# Patient Record
Sex: MALE | Race: WHITE
[De-identification: names, ages, dates, MRNs, and addresses within clinical notes are randomized per-mention and may not be internally consistent; named-entity substitution may affect disease eponyms.]

---

## 2020-03-18 ENCOUNTER — HOSPITAL ENCOUNTER (EMERGENCY)
Dept: HOSPITAL 50 - VM.ED | Age: 70
Discharge: HOME | End: 2020-03-18
Payer: MEDICARE

## 2020-03-18 VITALS — HEART RATE: 112 BPM | SYSTOLIC BLOOD PRESSURE: 173 MMHG | DIASTOLIC BLOOD PRESSURE: 88 MMHG

## 2020-03-18 DIAGNOSIS — Z88.5: ICD-10-CM

## 2020-03-18 DIAGNOSIS — E11.9: ICD-10-CM

## 2020-03-18 DIAGNOSIS — Z91.018: ICD-10-CM

## 2020-03-18 DIAGNOSIS — Z91.012: ICD-10-CM

## 2020-03-18 DIAGNOSIS — J32.9: Primary | ICD-10-CM

## 2020-03-18 DIAGNOSIS — Z88.0: ICD-10-CM

## 2020-03-18 DIAGNOSIS — Z79.899: ICD-10-CM

## 2020-03-18 DIAGNOSIS — J45.909: ICD-10-CM

## 2020-03-18 DIAGNOSIS — J40: ICD-10-CM

## 2020-03-18 DIAGNOSIS — Z79.4: ICD-10-CM

## 2020-03-18 LAB
ANION GAP SERPL CALC-SCNC: 16 MMOL/L (ref 10–20)
CHLORIDE SERPL-SCNC: 100 MMOL/L (ref 98–107)
SODIUM SERPL-SCNC: 136 MMOL/L (ref 136–145)

## 2020-03-18 NOTE — CR
______________________________________________________________________________   

  

[id number] RAD/RAD Chest PA And Lateral  

EXAM:   

   

 RAD Chest PA And Lateral  

   

 CLINICAL DATA:   

   

 SHORTNESS OF BREATH  

   

 CHEST PAIN  

   

 COMPARISON:   

   

 NO PREVIOUS SIMILAR EXAM IS AVAILABLE.  

   

 FINDINGS:   

   

 There is minimal pleural reaction at the left lung base  

   

 There are surgical changes of the cervical spine and lumbar spine  

   

 The lungs otherwise are clear  

   

 The cardiomediastinal contour is moderately prominent  

   

 IMPRESSION:  

   

 NO ACUTE PROCESS.  

   

 Electronically signed by Yoel Concepcion MD on 3/18/2020 7:39 AM  

   

  

Yoel Concepcion MD                 

 03/18/20 0741    

  

Thank you for allowing us to participate in the care of your patient.

## 2020-06-14 ENCOUNTER — HOSPITAL ENCOUNTER (EMERGENCY)
Dept: HOSPITAL 50 - VM.ED | Age: 70
Discharge: HOME | End: 2020-06-14
Payer: MEDICARE

## 2020-06-14 VITALS — HEART RATE: 77 BPM | DIASTOLIC BLOOD PRESSURE: 81 MMHG | SYSTOLIC BLOOD PRESSURE: 132 MMHG

## 2020-06-14 DIAGNOSIS — Z88.5: ICD-10-CM

## 2020-06-14 DIAGNOSIS — Z88.1: ICD-10-CM

## 2020-06-14 DIAGNOSIS — Z88.8: ICD-10-CM

## 2020-06-14 DIAGNOSIS — Z79.899: ICD-10-CM

## 2020-06-14 DIAGNOSIS — Z91.010: ICD-10-CM

## 2020-06-14 DIAGNOSIS — J45.909: ICD-10-CM

## 2020-06-14 DIAGNOSIS — Z79.4: ICD-10-CM

## 2020-06-14 DIAGNOSIS — Z91.012: ICD-10-CM

## 2020-06-14 DIAGNOSIS — I26.99: Primary | ICD-10-CM

## 2020-06-14 DIAGNOSIS — Z88.0: ICD-10-CM

## 2020-06-14 DIAGNOSIS — E11.9: ICD-10-CM

## 2020-06-14 LAB
ANION GAP SERPL CALC-SCNC: 16 MMOL/L (ref 10–20)
CHLORIDE SERPL-SCNC: 101 MMOL/L (ref 98–107)
SODIUM SERPL-SCNC: 134 MMOL/L (ref 136–145)

## 2020-06-14 PROCEDURE — U0002 COVID-19 LAB TEST NON-CDC: HCPCS

## 2020-06-14 NOTE — EDM.PDOC
ED HPI GENERAL MEDICAL PROBLEM





- General


Chief Complaint: Respiratory Problem


Stated Complaint: shortness of breath


Time Seen by Provider: 06/14/20 13:52


Source of Information: Reports: Patient


History Limitations: Reports: No Limitations





- History of Present Illness


INITIAL COMMENTS - FREE TEXT/NARRATIVE: 





Patient is a 69 year old who presents to ER with complaints of shortness of 

breath, trouble getting his air in.  Does use a CPAP and was unable to use it 

as couldn't get enough air in.  Tried a nebulizer treatment without relief.  

Had spinal fusion done 11 days ago, has been wearing his back brace as 

directed.  Does not feel that is inhibiting him but does worry about the pain 

meds suppressing his breathing as they have done that in the past.  Has a mild 

cough.  No production.  No fevers.  Has not noted any swelling in his legs as 

of late.  Unable to lay down well without feeling short of breath.  Was 

drinking water today and felt like he was choking somewhat as it wouldn't pass 

through.





Patient noted to have fusion of T2-T11.  Relates it is his 6th back surgery.


Onset: Gradual


Duration: Hour(s):, Constant


Location: Reports: Chest


Improves with: Reports: Other (sitting forward)


Associated Symptoms: Reports: Cough, Shortness of Breath.  Denies: Confusion, 

Chest Pain, cough w sputum, Fever/Chills, Loss of Appetite, Nausea/Vomiting


Treatments PTA: Reports: Breathing Treatments





- Related Data


 Allergies











Allergy/AdvReac Type Severity Reaction Status Date / Time


 


albumin colloid, human Allergy  Other Verified 06/14/20 14:04


 


atorvastatin [From Lipitor] Allergy  Muscle Verified 06/14/20 14:04





   Aches  


 


benztropine Allergy  Other Verified 06/14/20 14:03


 


levofloxacin [From Levaquin] Allergy  Nausea and Verified 06/14/20 14:04





   Vomiting  


 


morphine Allergy  Other Verified 06/14/20 14:03


 


Penicillins Allergy  Other Verified 06/14/20 14:03


 


egg white Allergy  Numbness Uncoded 03/18/20 06:46


 


peanuts Allergy  Chest Pain Uncoded 03/18/20 06:46











Home Meds: 


 Home Meds





Insulin Glarg,Human.Rec.Analog [Lantus] 80 units SUBCUT BEDTIME 10/17/14 [

History]


atorvaSTATin Calcium [Atorvastatin Calcium] 1 tab PO DAILY 10/20/14 [History]


Albuterol [Proair HFA] 2 puff INH Q4HR PRN 06/14/20 [History]


Albuterol [Proventil Neb Soln] 1.25 mg NEB Q6H PRN 06/14/20 [History]


Budesonide [Pulmicort] 0.5 mg IH BID 06/14/20 [History]


Ondansetron [Zofran] 4 mg PO ASDIRECTED PRN 06/14/20 [History]


oxyCODONE 5 mg PO Q4H PRN 06/14/20 [History]











Past Medical History


Respiratory History: Reports: Asthma, Sleep Apnea


Gastrointestinal History: Reports: Colon Polyp


Other Genitourinary History: kidney stone hx


Musculoskeletal History: Reports: Back Pain, Chronic


Endocrine/Metabolic History: Reports: Diabetes, Type II


Other Endocrine/Metabolic History: takes insulin


Other Dermatologic History: left eye and lip-skin CA removed





- Infectious Disease History


Infectious Disease History: Reports: Chicken Pox





- Past Surgical History


Other Male  Surgeries/Procedures: partial left kidney removal, 30 years ago


Musculoskeletal Surgical History: Reports: Shoulder Surgery, Other (See Below)


Other Musculoskeletal Surgeries/Procedures:: fusion in 1968, 1969, rods and 

screws 1999, removed and replaced in 2001, 2002 more rodes and screws (Dr. Ying

)- lumbar





Social & Family History





- Caffeine Use


Caffeine Use: Reports: Coffee





ED ROS GENERAL





- Review of Systems


Review Of Systems: See Below


Constitutional: Reports: Malaise, Weakness.  Denies: Fever, Chills, Decreased 

Appetite


HEENT: Denies: Rhinitis, Sinus Problem, Throat Pain


Respiratory: Reports: Shortness of Breath, Cough.  Denies: Sputum


Cardiovascular: Denies: Chest Pain, Edema, Lightheadedness


Endocrine: Reports: Fatigue


GI/Abdominal: Denies: Abdominal Pain, Constipation, Diarrhea, Nausea, Vomiting


: Reports: No Symptoms


Musculoskeletal: Reports: Back Pain


Skin: Reports: Other (incision)


Neurological: Reports: Weakness





ED EXAM, GENERAL





- Physical Exam


Exam: See Below


Exam Limited By: No Limitations


General Appearance: Alert, WD/WN, No Apparent Distress


Ears: Normal External Exam, Normal TMs


Nose: Normal Inspection, Normal Mucosa, No Blood


Throat/Mouth: Normal Inspection, Normal Oropharynx


Head: Normocephalic


Neck: Normal Inspection, Supple, Non-Tender


Respiratory/Chest: Lungs Clear, Decreased Breath Sounds


Cardiovascular: Regular Rate, Rhythm, No Edema


GI/Abdominal: Normal Bowel Sounds, Soft, Non-Tender


Back Exam: Other (Full length of his back incision is clean and dry, no redness 

or drainage.  )


Extremities: Normal Inspection, No Pedal Edema


Neurological: Alert, Oriented


Skin Exam: Warm, Dry, Wound/Incision (healing well)





Course





- Vital Signs


Last Recorded V/S: 


 Last Vital Signs











Temp  98.4 F   06/14/20 16:45


 


Pulse  77   06/14/20 16:45


 


Resp  20   06/14/20 16:45


 


BP  132/81   06/14/20 16:45


 


Pulse Ox  99   06/14/20 16:45














- Orders/Labs/Meds


Orders: 


 Active Orders 24 hr











 Category Date Time Status


 


 Apixaban [Eliquis] Med  06/14/20 20:00 Active





 2.5 mg PO BID   








 Medication Orders





Apixaban (Eliquis)  2.5 mg PO BID KATI








Labs: 


 Laboratory Tests











  06/14/20 06/14/20 06/14/20 Range/Units





  13:50 15:08 15:08 


 


WBC   12.1 H   (4.0-10.0)  x10^3/uL


 


RBC   3.75 L   (4.5-6.0)  x10^6/uL


 


Hgb   11.8 L D   (14.0-18.0)  g/dL


 


Hct   35.4 L   (40.0-52.0)  %


 


MCV   94.4 H   (78.0-93.0)  fL


 


MCH   31.5   (26.0-32.0)  pg


 


MCHC   33.3   (32.0-36.0)  g/dL


 


RDW Coeff of Guido   12.3   (10.0-15.0)  %


 


Plt Count   358  D   (130-400)  x10^3/uL


 


Neut % (Auto)   68.5   (50.0-80.0)  %


 


Lymph % (Auto)   18.1 L   (25.0-50.0)  %


 


Mono % (Auto)   6.6   (2.0-11.0)  %


 


Eos % (Auto)   6.3 H   (0.0-4.0)  %


 


Baso % (Auto)   0.5   (0.2-1.2)  %


 


D-Dimer, Quantitative    2.56 H  (<=0.58)  mg/LFEU


 


Sodium     (136-145)  mmol/L


 


Potassium     (3.5-5.1)  mmol/L


 


Chloride     ()  mmol/L


 


Carbon Dioxide     (21-32)  mmol/L


 


Anion Gap     (10-20)  mmol/L


 


BUN     (7-18)  mg/dL


 


Creatinine     (0.70-1.30)  mg/dL


 


Est Cr Clr Drug Dosing     


 


Estimated GFR (MDRD)     


 


Glucose     ()  mg/dL


 


Calcium     (8.5-10.1)  mg/dL


 


Lactate Dehydrogenase     ()  U/L


 


Creatine Kinase     ()  U/L


 


Troponin I     (<=0.056)  ng/mL


 


C-Reactive Protein     (<=0.9)  mg/dL


 


NT-Pro-B Natriuret Pep     (<=125)  pg/mL


 


SARS-CoV-2 RNA (RT-PCR)  Negative    (NEGATIVE)  














  06/14/20 06/14/20 Range/Units





  15:08 15:08 


 


WBC    (4.0-10.0)  x10^3/uL


 


RBC    (4.5-6.0)  x10^6/uL


 


Hgb    (14.0-18.0)  g/dL


 


Hct    (40.0-52.0)  %


 


MCV    (78.0-93.0)  fL


 


MCH    (26.0-32.0)  pg


 


MCHC    (32.0-36.0)  g/dL


 


RDW Coeff of Guido    (10.0-15.0)  %


 


Plt Count    (130-400)  x10^3/uL


 


Neut % (Auto)    (50.0-80.0)  %


 


Lymph % (Auto)    (25.0-50.0)  %


 


Mono % (Auto)    (2.0-11.0)  %


 


Eos % (Auto)    (0.0-4.0)  %


 


Baso % (Auto)    (0.2-1.2)  %


 


D-Dimer, Quantitative    (<=0.58)  mg/LFEU


 


Sodium   134 L  (136-145)  mmol/L


 


Potassium   5.0  (3.5-5.1)  mmol/L


 


Chloride   101  ()  mmol/L


 


Carbon Dioxide   22  (21-32)  mmol/L


 


Anion Gap   16.0  (10-20)  mmol/L


 


BUN   7  (7-18)  mg/dL


 


Creatinine   1.0  (0.70-1.30)  mg/dL


 


Est Cr Clr Drug Dosing   TNP  


 


Estimated GFR (MDRD)   > 60  


 


Glucose   142 H  ()  mg/dL


 


Calcium   9.0  (8.5-10.1)  mg/dL


 


Lactate Dehydrogenase  186   ()  U/L


 


Creatine Kinase  51   ()  U/L


 


Troponin I  < 0.017   (<=0.056)  ng/mL


 


C-Reactive Protein  3.5 H   (<=0.9)  mg/dL


 


NT-Pro-B Natriuret Pep  160 H   (<=125)  pg/mL


 


SARS-CoV-2 RNA (RT-PCR)    (NEGATIVE)  











Meds: 


Medications











Generic Name Dose Route Start Last Admin





  Trade Name Freq  PRN Reason Stop Dose Admin


 


Apixaban  2.5 mg  06/14/20 20:00  





  Eliquis  PO   





  BID KATI   





     





     





     





     














Discontinued Medications














Generic Name Dose Route Start Last Admin





  Trade Name Freq  PRN Reason Stop Dose Admin


 


Hydrocodone Bitart/Acetaminophen  1 tab  06/14/20 15:38  06/14/20 16:16





  Norco 325-5 Mg  PO  06/14/20 15:39  1 tab





  ONETIME ONE   Administration





     





     





     





     


 


Hydrocodone Bitart/Acetaminophen  1 packet  06/14/20 17:22  





  Take Home: Acetam/Hydrocodon 325-5 Mg, 5 Pack  PO  06/14/20 17:23  





  ONETIME ONE   





     





     





     





     


 


Iopamidol  100 ml  06/14/20 15:57  06/14/20 17:02





  Isovue-300 (61%)  IVPUSH  06/14/20 15:58  100 ml





  ONETIME ONE   Administration





     





     





     





     














- Re-Assessments/Exams


Free Text/Narrative Re-Assessment/Exam: 





06/14/20 1400-1530-Patient waiting on lab results as was with other emergent 

patient. 





15:40-labs are noted.  WBC mildly elevated.  Sodium 134.  D-dimer is 2.56.  CRP 

3.5.  ProBNP 160.  Will proceed with CTA of chest.  Discussed change in 

narcotics with patient.  Had similar type of chest discomfort/shortness of 

breath from morphine in the past so hesitant to give him Dilaudid.  Is willing 

to try Norco, has used in the past but states doesn't work as well as 

oxycodone.  Does not want to take any further oxycodone as he feels it is 

contributing to his shortness of breath and discomfort.  Is still 

uncomfortable.  [place] given.  Difficulty finding a vein for procedure, patient 

not tolerating much at this point without being frustrated.  Stressed the 

importance of having the CTA due to severity of PEs and potential fatality with 

them.  Discussed patient going to [facility] to have test done as could do VQ study.

  Refused.  Discussed possibly treating for PE tonight and returning for test 

tomorrow.  Eventually were able to get IV placed.  


06/14/20 17:26


Patient CT scan is positive for 2 small PEs.  Will start Eliquis tonight.  











Departure





- Departure


Time of Disposition: 17:27


Disposition: Home, Self-Care 01


Condition: Fair


Clinical Impression: 


 Pulmonary emboli








- Discharge Information


*PRESCRIPTION DRUG MONITORING PROGRAM REVIEWED*: No


*COPY OF PRESCRIPTION DRUG MONITORING REPORT IN PATIENT RODNEY: No


Instructions:  Pulmonary Embolism


Referrals: 


Saba Freeman DO [Primary Care Provider] - 


Forms:  ED Department Discharge


Additional Instructions: 


1.  Rest


2.  Norco every 6 hours as needed for pain.  


3.  Start Eliquis- 10 mg twice a day for 7 days then 5 mg BID


4.  Follow up with Dr. Freeman later this week





Sepsis Event Note (ED)





- Focused Exam


Vital Signs: 


 Vital Signs











  Temp Pulse Resp BP Pulse Ox


 


 06/14/20 16:45  98.4 F  77  20  132/81  99


 


 06/14/20 15:45   75  16  129/77  98


 


 06/14/20 14:45   78  16  138/84  99


 


 06/14/20 13:45  97.6 F  81  18  148/86 H  99














- My Orders


Last 24 Hours: 


My Active Orders





06/14/20 20:00


Apixaban [Eliquis]   2.5 mg PO BID 














- Assessment/Plan


Last 24 Hours: 


My Active Orders





06/14/20 20:00


Apixaban [Eliquis]   2.5 mg PO BID

## 2020-06-14 NOTE — CR
______________________________________________________________________________   

  

[id number] RAD/RAD Chest PA And Lateral  

EXAM: FRONTAL AND LATERAL CHEST  

   

 INDICATION: SHORTNESS OF BREATH.  

   

 COMPARISON: March 18, 2020.  

   

 DISCUSSION: Extensive posterior and left lateral fusion hardware in the  

 thoracolumbar spine. Some of the fusion hardware in the thoracic spine is new  

 relative to the prior study. The heart is at upper limits of normal for size  

 without evidence of congestive heart failure. No acute infiltrates.  

   

 IMPRESSION:    

 1.  No acute findings.  

   

 Electronically signed by Devon Oquendo MD on 6/14/2020 3:19 PM  

   

  

Devon Oquendo MD                 

 06/14/20 [id number]    

  

Thank you for allowing us to participate in the care of your patient.

## 2020-06-14 NOTE — CT
______________________________________________________________________________   

  

[id number] CT/CTA Chest  

EXAM: CT ANGIOGRAM CHEST  

   

 INDICATION: Shortness of breath and elevated d-dimer.  

   

 COMPARISON: Chest radiograph same date.  

   

 DISCUSSION:   

 There are few small opacified right upper and right middle lobe pulmonary  

 arteries compatible with acute emboli. No definite emboli on the left, but  

 motion artifact mildly limits assessment. No evidence of right heart strain.  

 There is a small associated right pleural effusion. Mild atelectasis in the lung  

 bases with no consolidation.  

   

 There are indeterminate noncalcified nodules in the right middle lobe measuring  

 8.5 x 5 and 5 mm (images 61 and 58 of series 5).  

   

 No adenopathy. No left effusion or pericardial effusion. Normal heart size.  

 Scattered atherosclerotic plaque in the aorta and coronary arteries.  

   

 Extensive posterior fusion changes within the thoracolumbar spine are partially  

 imaged.  

   

 Results called at time of dictation.  

   

 IMPRESSION:    

 1.  Small acute right upper and right middle lobe pulmonary emboli without  

 evidence of right heart strain.  

 2.  Small right pleural effusion.  

 3.  Indeterminate noncalcified 8.5 by size and 5 mm right middle lobe pulmonary  

 nodules.  

   

 Electronically signed by Devon Oquendo MD on 6/14/2020 5:20 PM  

   

  

Devon Oquendo MD                 

 06/14/20 [id number]    

  

Thank you for allowing us to participate in the care of your patient.

## 2022-04-18 ENCOUNTER — HOSPITAL ENCOUNTER (OUTPATIENT)
Dept: HOSPITAL 50 - VM.SDS | Age: 72
Discharge: HOME | End: 2022-04-18
Attending: SURGERY
Payer: MEDICARE

## 2022-04-18 VITALS — SYSTOLIC BLOOD PRESSURE: 135 MMHG | DIASTOLIC BLOOD PRESSURE: 69 MMHG | HEART RATE: 65 BPM

## 2022-04-18 DIAGNOSIS — Z91.010: ICD-10-CM

## 2022-04-18 DIAGNOSIS — E11.42: ICD-10-CM

## 2022-04-18 DIAGNOSIS — E11.69: ICD-10-CM

## 2022-04-18 DIAGNOSIS — Z88.0: ICD-10-CM

## 2022-04-18 DIAGNOSIS — Z12.11: Primary | ICD-10-CM

## 2022-04-18 DIAGNOSIS — E66.01: ICD-10-CM

## 2022-04-18 DIAGNOSIS — Z88.8: ICD-10-CM

## 2022-04-18 DIAGNOSIS — Z79.899: ICD-10-CM

## 2022-04-18 DIAGNOSIS — Z91.012: ICD-10-CM

## 2022-04-18 DIAGNOSIS — Z79.4: ICD-10-CM

## 2022-04-18 DIAGNOSIS — D12.6: ICD-10-CM

## 2022-04-18 DIAGNOSIS — Z87.891: ICD-10-CM

## 2024-08-10 ENCOUNTER — HOSPITAL ENCOUNTER (EMERGENCY)
Dept: HOSPITAL 50 - VM.ED | Age: 74
Discharge: HOME | End: 2024-08-10
Payer: MEDICARE

## 2024-08-10 VITALS — DIASTOLIC BLOOD PRESSURE: 74 MMHG | SYSTOLIC BLOOD PRESSURE: 110 MMHG | HEART RATE: 92 BPM

## 2024-08-10 DIAGNOSIS — Z91.018: ICD-10-CM

## 2024-08-10 DIAGNOSIS — Z79.899: ICD-10-CM

## 2024-08-10 DIAGNOSIS — U07.1: Primary | ICD-10-CM

## 2024-08-10 DIAGNOSIS — Z91.010: ICD-10-CM

## 2024-08-10 DIAGNOSIS — Z88.1: ICD-10-CM

## 2024-08-10 DIAGNOSIS — Z88.0: ICD-10-CM

## 2024-08-10 DIAGNOSIS — J45.909: ICD-10-CM

## 2024-08-10 DIAGNOSIS — Z79.4: ICD-10-CM

## 2024-08-10 DIAGNOSIS — Z91.048: ICD-10-CM

## 2024-08-10 DIAGNOSIS — E66.9: ICD-10-CM

## 2024-08-10 LAB
ALBUMIN SERPL-MCNC: 3.7 G/DL (ref 3.4–5)
ALBUMIN/GLOB SERPL: 0.88 {RATIO}
ALP SERPL-CCNC: 100 U/L (ref 46–116)
ALT SERPL-CCNC: 28 U/L (ref 16–63)
ANION GAP SERPL CALC-SCNC: 15.5 MMOL/L (ref 5–15)
APPEARANCE UR: CLEAR
AST SERPL-CCNC: 23 U/L (ref 15–37)
BASOPHILS # BLD AUTO: 0 X10^3/UL (ref 0–0.2)
BASOPHILS NFR BLD AUTO: 0.4 % (ref 0.2–1.2)
BILIRUB SERPL-MCNC: 0.4 MG/DL (ref 0.2–1)
BILIRUB UR STRIP-MCNC: NEGATIVE MG/DL
BUN SERPL-MCNC: 14 MG/DL (ref 7–18)
CALCIUM SERPL-MCNC: 8.7 MG/DL (ref 8.5–10.1)
CHLORIDE SERPL-SCNC: 97 MMOL/L (ref 98–107)
CO2 SERPL-SCNC: 26 MMOL/L (ref 21–32)
COLOR UR: YELLOW
CREAT CL 24H UR+SERPL-VRATE: 62.88 ML/MIN
CREAT SERPL-MCNC: 1.2 MG/DL (ref 0.7–1.3)
CRP SERPL-MCNC: 2.66 MG/DL (ref ?–0.5)
EGFRCR SERPLBLD CKD-EPI 2021: 64 ML/MIN (ref 60–?)
EOSINOPHIL # BLD AUTO: 0.1 X10^3/UL (ref 0–0.5)
EOSINOPHIL NFR BLD AUTO: 1.4 % (ref 0–4)
FLUAV RNA UPPER RESP QL NAA+PROBE: NEGATIVE
FLUBV RNA UPPER RESP QL NAA+PROBE: NEGATIVE
GLOBULIN SER-MCNC: 4.2 G/DL
GLUCOSE SERPL-MCNC: 130 MG/DL (ref 70–99)
GLUCOSE UR STRIP-MCNC: NEGATIVE MG/DL
HCT VFR BLD AUTO: 44.4 % (ref 40–52)
HGB BLD-MCNC: 15.1 G/DL (ref 14–18)
IMM GRANULOCYTES # BLD: 0.02 X10^3/UL (ref 0–0.07)
IMM GRANULOCYTES NFR BLD: 0.4 % (ref 0–0.43)
KETONES UR STRIP-MCNC: NEGATIVE MG/DL
LACTATE SERPL-SCNC: 1 MMOL/L (ref 0.4–2)
LYMPHOCYTES # BLD AUTO: 0.9 X10^3/UL (ref 1–4.8)
LYMPHOCYTES NFR BLD AUTO: 15.8 % (ref 25–50)
MCH RBC QN AUTO: 31.7 PG (ref 26–32)
MCHC RBC AUTO-ENTMCNC: 34 G/DL (ref 32–36)
MCHC RBC AUTO-ENTMCNC: 93.1 FL (ref 78–93)
MONOCYTES # BLD AUTO: 1 X10^3/UL (ref 0–0.8)
MONOCYTES NFR BLD AUTO: 17.6 % (ref 2–11)
NEUTROPHILS # BLD AUTO: 3.7 X10^3/UL (ref 1.8–7.7)
NEUTROPHILS NFR BLD AUTO: 64.4 % (ref 50–80)
NITRITE UR QL: NEGATIVE
PH UR STRIP: 5.5 [PH] (ref 5–8)
PLATELET # BLD AUTO: 133 X10^3/UL (ref 130–400)
POTASSIUM SERPL-SCNC: 4.5 MMOL/L (ref 3.5–5.1)
PROT SERPL-MCNC: 7.9 G/DL (ref 6.4–8.2)
PROT UR STRIP-MCNC: NEGATIVE MG/DL
RBC # BLD AUTO: 4.77 X10^6/UL (ref 4.5–6)
RBC UR QL: NEGATIVE
RSV RNA UPPER RESP QL NAA+PROBE: NEGATIVE
SARS-COV-2 RNA RESP QL NAA+PROBE: POSITIVE
SODIUM SERPL-SCNC: 134 MMOL/L (ref 136–145)
SP GR UR STRIP: 1.01 (ref 1–1.03)
UROBILINOGEN UR STRIP-ACNC: 0.2 EU/DL
WBC # BLD AUTO: 5.7 X10^3/UL (ref 4–10)